# Patient Record
Sex: FEMALE | Race: WHITE | HISPANIC OR LATINO
[De-identification: names, ages, dates, MRNs, and addresses within clinical notes are randomized per-mention and may not be internally consistent; named-entity substitution may affect disease eponyms.]

---

## 2017-04-05 ENCOUNTER — APPOINTMENT (OUTPATIENT)
Dept: FAMILY MEDICINE | Facility: CLINIC | Age: 57
End: 2017-04-05

## 2017-04-05 VITALS
DIASTOLIC BLOOD PRESSURE: 78 MMHG | HEART RATE: 70 BPM | RESPIRATION RATE: 15 BRPM | HEIGHT: 61.5 IN | SYSTOLIC BLOOD PRESSURE: 120 MMHG | OXYGEN SATURATION: 98 % | BODY MASS INDEX: 25.72 KG/M2 | WEIGHT: 138 LBS | TEMPERATURE: 98.4 F

## 2017-04-05 DIAGNOSIS — R59.0 LOCALIZED ENLARGED LYMPH NODES: ICD-10-CM

## 2017-05-15 ENCOUNTER — EMERGENCY (EMERGENCY)
Facility: HOSPITAL | Age: 57
LOS: 1 days | Discharge: HOME | End: 2017-05-15
Admitting: EMERGENCY MEDICINE
Payer: COMMERCIAL

## 2017-05-15 PROCEDURE — 73630 X-RAY EXAM OF FOOT: CPT | Mod: 26,RT

## 2017-05-15 PROCEDURE — 99284 EMERGENCY DEPT VISIT MOD MDM: CPT | Mod: 25

## 2017-05-15 PROCEDURE — 73630 X-RAY EXAM OF FOOT: CPT

## 2017-05-15 PROCEDURE — 29515 APPLICATION SHORT LEG SPLINT: CPT

## 2017-05-15 PROCEDURE — 73610 X-RAY EXAM OF ANKLE: CPT | Mod: 26,RT

## 2017-05-15 PROCEDURE — 73610 X-RAY EXAM OF ANKLE: CPT

## 2017-05-15 PROCEDURE — 99283 EMERGENCY DEPT VISIT LOW MDM: CPT | Mod: 25

## 2017-05-15 PROCEDURE — 29515 APPLICATION SHORT LEG SPLINT: CPT | Mod: RT

## 2017-05-22 ENCOUNTER — APPOINTMENT (OUTPATIENT)
Dept: FAMILY MEDICINE | Facility: CLINIC | Age: 57
End: 2017-05-22

## 2017-06-22 ENCOUNTER — APPOINTMENT (OUTPATIENT)
Dept: RADIOLOGY | Facility: HOSPITAL | Age: 57
End: 2017-06-22

## 2017-06-22 ENCOUNTER — OUTPATIENT (OUTPATIENT)
Dept: OUTPATIENT SERVICES | Facility: HOSPITAL | Age: 57
LOS: 1 days | End: 2017-06-22
Payer: COMMERCIAL

## 2017-06-22 PROCEDURE — 73630 X-RAY EXAM OF FOOT: CPT

## 2019-08-19 ENCOUNTER — APPOINTMENT (OUTPATIENT)
Dept: FAMILY MEDICINE | Facility: CLINIC | Age: 59
End: 2019-08-19
Payer: COMMERCIAL

## 2019-08-19 VITALS
BODY MASS INDEX: 25.21 KG/M2 | TEMPERATURE: 98 F | OXYGEN SATURATION: 99 % | WEIGHT: 137 LBS | DIASTOLIC BLOOD PRESSURE: 78 MMHG | HEIGHT: 62 IN | SYSTOLIC BLOOD PRESSURE: 120 MMHG | HEART RATE: 61 BPM

## 2019-08-19 PROCEDURE — 99214 OFFICE O/P EST MOD 30 MIN: CPT

## 2019-08-19 RX ORDER — AMOXICILLIN AND CLAVULANATE POTASSIUM 875; 125 MG/1; MG/1
875-125 TABLET, COATED ORAL
Qty: 20 | Refills: 0 | Status: COMPLETED | COMMUNITY
Start: 2017-04-05 | End: 2019-08-19

## 2019-08-19 NOTE — PHYSICAL EXAM
[No Acute Distress] : no acute distress [Well Nourished] : well nourished [Well-Appearing] : well-appearing [Well Developed] : well developed [Normal Sclera/Conjunctiva] : normal sclera/conjunctiva [PERRL] : pupils equal round and reactive to light [Normal Outer Ear/Nose] : the outer ears and nose were normal in appearance [EOMI] : extraocular movements intact [No JVD] : no jugular venous distention [Normal Oropharynx] : the oropharynx was normal [Supple] : supple [No Lymphadenopathy] : no lymphadenopathy [Thyroid Normal, No Nodules] : the thyroid was normal and there were no nodules present [No Respiratory Distress] : no respiratory distress  [No Accessory Muscle Use] : no accessory muscle use [Clear to Auscultation] : lungs were clear to auscultation bilaterally [Normal Rate] : normal rate  [Regular Rhythm] : with a regular rhythm [No Murmur] : no murmur heard [Normal S1, S2] : normal S1 and S2 [No Carotid Bruits] : no carotid bruits [No Abdominal Bruit] : a ~M bruit was not heard ~T in the abdomen [Pedal Pulses Present] : the pedal pulses are present [No Varicosities] : no varicosities [No Edema] : there was no peripheral edema [No Palpable Aorta] : no palpable aorta [No Extremity Clubbing/Cyanosis] : no extremity clubbing/cyanosis [Soft] : abdomen soft [Non Tender] : non-tender [Non-distended] : non-distended [No Masses] : no abdominal mass palpated [Normal Bowel Sounds] : normal bowel sounds [No HSM] : no HSM [Normal Anterior Cervical Nodes] : no anterior cervical lymphadenopathy [Normal Posterior Cervical Nodes] : no posterior cervical lymphadenopathy [No Spinal Tenderness] : no spinal tenderness [No CVA Tenderness] : no CVA  tenderness [Coordination Grossly Intact] : coordination grossly intact [No Rash] : no rash [No Focal Deficits] : no focal deficits [Normal Gait] : normal gait [Normal Affect] : the affect was normal [Deep Tendon Reflexes (DTR)] : deep tendon reflexes were 2+ and symmetric [Normal Insight/Judgement] : insight and judgment were intact

## 2019-08-19 NOTE — HISTORY OF PRESENT ILLNESS
[FreeTextEntry8] : Patient notes intermittent sore right upper arm pain that is worse when she moves her arm that has persisted for one month. Pain worse when she has to lift arm straight up. 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 she is right hand dominant. She is unable to lift right arm straight up. She has not taken anything for the pain.\par \par She has not been seen in our office for several years. Patient [place] in [place]. She notes that she is good health and denies pertinent past medical history. She reports needing colonoscopy, mammogram and pap. She also in need of comprehensive labs. HX of pre diabetes. She reports adhering to a healthy diet.

## 2019-08-19 NOTE — HEALTH RISK ASSESSMENT
[Yes] : Yes [1 or 2 (0 pts)] : 1 or 2 (0 points) [Monthly or less (1 pt)] : Monthly or less (1 point) [Never (0 pts)] : Never (0 points) [0] : 2) Feeling down, depressed, or hopeless: Not at all (0) [] : No [de-identified] : NOne  [de-identified] : NO

## 2019-09-03 ENCOUNTER — NON-APPOINTMENT (OUTPATIENT)
Age: 59
End: 2019-09-03

## 2019-09-03 ENCOUNTER — APPOINTMENT (OUTPATIENT)
Dept: FAMILY MEDICINE | Facility: CLINIC | Age: 59
End: 2019-09-03
Payer: COMMERCIAL

## 2019-09-03 VITALS
OXYGEN SATURATION: 98 % | DIASTOLIC BLOOD PRESSURE: 70 MMHG | HEIGHT: 62 IN | BODY MASS INDEX: 25.58 KG/M2 | TEMPERATURE: 98.8 F | SYSTOLIC BLOOD PRESSURE: 120 MMHG | WEIGHT: 139 LBS | HEART RATE: 71 BPM

## 2019-09-03 DIAGNOSIS — M25.511 PAIN IN RIGHT SHOULDER: ICD-10-CM

## 2019-09-03 PROCEDURE — 36415 COLL VENOUS BLD VENIPUNCTURE: CPT

## 2019-09-03 PROCEDURE — 99213 OFFICE O/P EST LOW 20 MIN: CPT | Mod: 25

## 2019-09-05 PROBLEM — M25.511 RIGHT SHOULDER PAIN: Status: ACTIVE | Noted: 2019-08-19

## 2019-09-05 NOTE — HISTORY OF PRESENT ILLNESS
[de-identified] : Patients shoulder pain has improved. At rest she has no pain. She has mild pain when she lifts her arm straight up or laterally to the right. Meloxicam did help with the pain. \par \par Overall patient reports she is in her usual state of health. She did not have xray or labs taken at [address] [place] [address].

## 2019-09-05 NOTE — PHYSICAL EXAM
[No Acute Distress] : no acute distress [Well Nourished] : well nourished [Well Developed] : well developed [Well-Appearing] : well-appearing [Normal Sclera/Conjunctiva] : normal sclera/conjunctiva [PERRL] : pupils equal round and reactive to light [EOMI] : extraocular movements intact [Normal Oropharynx] : the oropharynx was normal [Normal Outer Ear/Nose] : the outer ears and nose were normal in appearance [No JVD] : no jugular venous distention [No Lymphadenopathy] : no lymphadenopathy [Supple] : supple [Thyroid Normal, No Nodules] : the thyroid was normal and there were no nodules present [No Respiratory Distress] : no respiratory distress  [No Accessory Muscle Use] : no accessory muscle use [Clear to Auscultation] : lungs were clear to auscultation bilaterally [Normal Rate] : normal rate  [Regular Rhythm] : with a regular rhythm [Normal S1, S2] : normal S1 and S2 [No Murmur] : no murmur heard [No Carotid Bruits] : no carotid bruits [No Abdominal Bruit] : a ~M bruit was not heard ~T in the abdomen [No Varicosities] : no varicosities [Pedal Pulses Present] : the pedal pulses are present [No Edema] : there was no peripheral edema [No Palpable Aorta] : no palpable aorta [No Extremity Clubbing/Cyanosis] : no extremity clubbing/cyanosis [Soft] : abdomen soft [Non Tender] : non-tender [Non-distended] : non-distended [No Masses] : no abdominal mass palpated [No HSM] : no HSM [Normal Bowel Sounds] : normal bowel sounds [Normal Posterior Cervical Nodes] : no posterior cervical lymphadenopathy [Normal Anterior Cervical Nodes] : no anterior cervical lymphadenopathy [No CVA Tenderness] : no CVA  tenderness [No Spinal Tenderness] : no spinal tenderness [No Rash] : no rash [Coordination Grossly Intact] : coordination grossly intact [No Focal Deficits] : no focal deficits [Normal Gait] : normal gait [Deep Tendon Reflexes (DTR)] : deep tendon reflexes were 2+ and symmetric [Normal Affect] : the affect was normal [Normal Insight/Judgement] : insight and judgment were intact

## 2019-09-13 LAB
ALBUMIN SERPL ELPH-MCNC: 4.9 G/DL
ALP BLD-CCNC: 71 U/L
ALT SERPL-CCNC: 58 U/L
ANION GAP SERPL CALC-SCNC: 12 MMOL/L
APPEARANCE: CLEAR
AST SERPL-CCNC: 32 U/L
BACTERIA: NEGATIVE
BASOPHILS # BLD AUTO: 0.02 K/UL
BASOPHILS NFR BLD AUTO: 0.3 %
BILIRUB SERPL-MCNC: 0.3 MG/DL
BILIRUBIN URINE: NEGATIVE
BLOOD URINE: NORMAL
BUN SERPL-MCNC: 26 MG/DL
CALCIUM SERPL-MCNC: 9.7 MG/DL
CHLORIDE SERPL-SCNC: 107 MMOL/L
CHOLEST SERPL-MCNC: 169 MG/DL
CHOLEST/HDLC SERPL: 3.9 RATIO
CO2 SERPL-SCNC: 23 MMOL/L
COLOR: NORMAL
CREAT SERPL-MCNC: 0.56 MG/DL
EOSINOPHIL # BLD AUTO: 0.11 K/UL
EOSINOPHIL NFR BLD AUTO: 1.8 %
ESTIMATED AVERAGE GLUCOSE: 108 MG/DL
FERRITIN SERPL-MCNC: 143 NG/ML
FOLATE SERPL-MCNC: 16.1 NG/ML
GLUCOSE QUALITATIVE U: NEGATIVE
GLUCOSE SERPL-MCNC: 106 MG/DL
HBA1C MFR BLD HPLC: 5.4 %
HCT VFR BLD CALC: 36.3 %
HDLC SERPL-MCNC: 43 MG/DL
HGB A MFR BLD: 97.5 %
HGB A2 MFR BLD: 2.5 %
HGB BLD-MCNC: 11.5 G/DL
HGB FRACT BLD-IMP: NORMAL
HYALINE CASTS: 0 /LPF
IMM GRANULOCYTES NFR BLD AUTO: 0.3 %
IRON SATN MFR SERPL: 24 %
IRON SERPL-MCNC: 108 UG/DL
KETONES URINE: NEGATIVE
LDLC SERPL CALC-MCNC: 103 MG/DL
LEUKOCYTE ESTERASE URINE: NEGATIVE
LYMPHOCYTES # BLD AUTO: 1.67 K/UL
LYMPHOCYTES NFR BLD AUTO: 27.5 %
MAN DIFF?: NORMAL
MCHC RBC-ENTMCNC: 31.7 GM/DL
MCHC RBC-ENTMCNC: 32.5 PG
MCV RBC AUTO: 102.5 FL
MICROSCOPIC-UA: NORMAL
MONOCYTES # BLD AUTO: 0.33 K/UL
MONOCYTES NFR BLD AUTO: 5.4 %
NEUTROPHILS # BLD AUTO: 3.92 K/UL
NEUTROPHILS NFR BLD AUTO: 64.7 %
NITRITE URINE: NEGATIVE
PH URINE: 6
PLATELET # BLD AUTO: 269 K/UL
POTASSIUM SERPL-SCNC: 3.8 MMOL/L
PROT SERPL-MCNC: 7.3 G/DL
PROTEIN URINE: NEGATIVE
RBC # BLD: 3.54 M/UL
RBC # FLD: 12.8 %
RED BLOOD CELLS URINE: 4 /HPF
SODIUM SERPL-SCNC: 142 MMOL/L
SPECIFIC GRAVITY URINE: 1.03
SQUAMOUS EPITHELIAL CELLS: 1 /HPF
TIBC SERPL-MCNC: 446 UG/DL
TRIGL SERPL-MCNC: 113 MG/DL
UIBC SERPL-MCNC: 338 UG/DL
UROBILINOGEN URINE: NORMAL
VIT B12 SERPL-MCNC: 395 PG/ML
WBC # FLD AUTO: 6.07 K/UL
WHITE BLOOD CELLS URINE: 3 /HPF

## 2019-09-16 ENCOUNTER — APPOINTMENT (OUTPATIENT)
Dept: FAMILY MEDICINE | Facility: CLINIC | Age: 59
End: 2019-09-16

## 2019-09-17 ENCOUNTER — APPOINTMENT (OUTPATIENT)
Dept: FAMILY MEDICINE | Facility: CLINIC | Age: 59
End: 2019-09-17

## 2019-10-21 ENCOUNTER — APPOINTMENT (OUTPATIENT)
Dept: SURGERY | Facility: CLINIC | Age: 59
End: 2019-10-21
Payer: COMMERCIAL

## 2019-10-21 VITALS — BODY MASS INDEX: 23.05 KG/M2 | HEIGHT: 64 IN | WEIGHT: 135 LBS

## 2019-10-21 DIAGNOSIS — R10.31 RIGHT LOWER QUADRANT PAIN: ICD-10-CM

## 2019-10-21 DIAGNOSIS — Z83.3 FAMILY HISTORY OF DIABETES MELLITUS: ICD-10-CM

## 2019-10-21 DIAGNOSIS — Z83.79 FAMILY HISTORY OF OTHER DISEASES OF THE DIGESTIVE SYSTEM: ICD-10-CM

## 2019-10-21 DIAGNOSIS — Z82.49 FAMILY HISTORY OF ISCHEMIC HEART DISEASE AND OTHER DISEASES OF THE CIRCULATORY SYSTEM: ICD-10-CM

## 2019-10-21 DIAGNOSIS — R10.32 LEFT LOWER QUADRANT PAIN: ICD-10-CM

## 2019-10-21 PROCEDURE — 99203 OFFICE O/P NEW LOW 30 MIN: CPT

## 2019-10-22 PROBLEM — R10.31 ABDOMINAL PAIN, RLQ (RIGHT LOWER QUADRANT): Status: ACTIVE | Noted: 2019-10-22

## 2019-10-22 PROBLEM — R10.32 ABDOMINAL PAIN, ACUTE, LEFT LOWER QUADRANT: Status: ACTIVE | Noted: 2019-10-22

## 2019-10-22 NOTE — REVIEW OF SYSTEMS
[Shortness Of Breath] : no shortness of breath [Cough] : no cough [Abdominal Pain] : abdominal pain [Constipation] : no constipation [Negative] : Genitourinary

## 2019-10-22 NOTE — PHYSICAL EXAM
[Normal Breath Sounds] : Normal breath sounds [Normal Heart Sounds] : normal heart sounds [Normal Rate and Rhythm] : normal rate and rhythm [Abdominal Masses] : Abdominal mass present [Abdomen Tenderness] : ~T ~M No abdominal tenderness [No Rash or Lesion] : No rash or lesion [de-identified] : nl [de-identified] : nl [de-identified] : multiloculated mass in suprapubic area [de-identified] : nl

## 2019-10-22 NOTE — HISTORY OF PRESENT ILLNESS
[de-identified] : This 59 year old woman has been experiencing lower abdominal pain for the past two months. The patient has never undergone a colonoscopy. The patient denied any change in bowel habits nor blood in the stool.

## 2019-10-22 NOTE — ASSESSMENT
[FreeTextEntry1] : Long discussion regarding all options and risks\par To undergo a CT scan of abdomen and pelvis\par Colonoscopy in the near future

## 2019-10-28 ENCOUNTER — FORM ENCOUNTER (OUTPATIENT)
Age: 59
End: 2019-10-28

## 2019-10-29 ENCOUNTER — OUTPATIENT (OUTPATIENT)
Dept: OUTPATIENT SERVICES | Facility: HOSPITAL | Age: 59
LOS: 1 days | End: 2019-10-29
Payer: COMMERCIAL

## 2019-10-29 ENCOUNTER — APPOINTMENT (OUTPATIENT)
Dept: CT IMAGING | Facility: HOSPITAL | Age: 59
End: 2019-10-29
Payer: COMMERCIAL

## 2019-10-29 DIAGNOSIS — Z00.8 ENCOUNTER FOR OTHER GENERAL EXAMINATION: ICD-10-CM

## 2019-10-29 PROCEDURE — 74177 CT ABD & PELVIS W/CONTRAST: CPT

## 2019-10-29 PROCEDURE — 74177 CT ABD & PELVIS W/CONTRAST: CPT | Mod: 26

## 2022-11-01 ENCOUNTER — EMERGENCY (EMERGENCY)
Facility: HOSPITAL | Age: 62
LOS: 1 days | Discharge: HOME | End: 2022-11-01
Attending: EMERGENCY MEDICINE | Admitting: EMERGENCY MEDICINE
Payer: COMMERCIAL

## 2022-11-01 VITALS
WEIGHT: 130.07 LBS | SYSTOLIC BLOOD PRESSURE: 157 MMHG | DIASTOLIC BLOOD PRESSURE: 94 MMHG | RESPIRATION RATE: 19 BRPM | HEIGHT: 64 IN | HEART RATE: 76 BPM | OXYGEN SATURATION: 99 % | TEMPERATURE: 98 F

## 2022-11-01 PROCEDURE — 73060 X-RAY EXAM OF HUMERUS: CPT | Mod: 26,LT

## 2022-11-01 PROCEDURE — G1004: CPT

## 2022-11-01 PROCEDURE — 73200 CT UPPER EXTREMITY W/O DYE: CPT | Mod: 26,59,LT,MG

## 2022-11-01 PROCEDURE — 73030 X-RAY EXAM OF SHOULDER: CPT | Mod: 26,LT,77

## 2022-11-01 PROCEDURE — 73200 CT UPPER EXTREMITY W/O DYE: CPT | Mod: 26,77,LT,MA

## 2022-11-01 PROCEDURE — 99285 EMERGENCY DEPT VISIT HI MDM: CPT

## 2022-11-01 PROCEDURE — 73030 X-RAY EXAM OF SHOULDER: CPT | Mod: 26,LT

## 2022-11-01 PROCEDURE — 76376 3D RENDER W/INTRP POSTPROCES: CPT | Mod: 26,77

## 2022-11-01 PROCEDURE — 76376 3D RENDER W/INTRP POSTPROCES: CPT | Mod: 26

## 2022-11-01 RX ORDER — MORPHINE SULFATE 50 MG/1
4 CAPSULE, EXTENDED RELEASE ORAL ONCE
Refills: 0 | Status: DISCONTINUED | OUTPATIENT
Start: 2022-11-01 | End: 2022-11-01

## 2022-11-01 RX ORDER — PROPOFOL 10 MG/ML
50 INJECTION, EMULSION INTRAVENOUS ONCE
Refills: 0 | Status: COMPLETED | OUTPATIENT
Start: 2022-11-01 | End: 2022-11-01

## 2022-11-01 RX ORDER — MORPHINE SULFATE 50 MG/1
6 CAPSULE, EXTENDED RELEASE ORAL ONCE
Refills: 0 | Status: DISCONTINUED | OUTPATIENT
Start: 2022-11-01 | End: 2022-11-01

## 2022-11-01 RX ORDER — PROPOFOL 10 MG/ML
25 INJECTION, EMULSION INTRAVENOUS ONCE
Refills: 0 | Status: COMPLETED | OUTPATIENT
Start: 2022-11-01 | End: 2022-11-01

## 2022-11-01 RX ORDER — KETOROLAC TROMETHAMINE 30 MG/ML
30 SYRINGE (ML) INJECTION ONCE
Refills: 0 | Status: DISCONTINUED | OUTPATIENT
Start: 2022-11-01 | End: 2022-11-01

## 2022-11-01 RX ORDER — ONDANSETRON 8 MG/1
4 TABLET, FILM COATED ORAL ONCE
Refills: 0 | Status: COMPLETED | OUTPATIENT
Start: 2022-11-01 | End: 2022-11-01

## 2022-11-01 RX ADMIN — Medication 30 MILLIGRAM(S): at 22:28

## 2022-11-01 RX ADMIN — PROPOFOL 25 MILLIGRAM(S): 10 INJECTION, EMULSION INTRAVENOUS at 22:44

## 2022-11-01 RX ADMIN — MORPHINE SULFATE 4 MILLIGRAM(S): 50 CAPSULE, EXTENDED RELEASE ORAL at 17:32

## 2022-11-01 RX ADMIN — MORPHINE SULFATE 6 MILLIGRAM(S): 50 CAPSULE, EXTENDED RELEASE ORAL at 11:51

## 2022-11-01 RX ADMIN — MORPHINE SULFATE 4 MILLIGRAM(S): 50 CAPSULE, EXTENDED RELEASE ORAL at 13:53

## 2022-11-01 RX ADMIN — MORPHINE SULFATE 4 MILLIGRAM(S): 50 CAPSULE, EXTENDED RELEASE ORAL at 22:28

## 2022-11-01 RX ADMIN — ONDANSETRON 4 MILLIGRAM(S): 8 TABLET, FILM COATED ORAL at 13:53

## 2022-11-01 RX ADMIN — Medication 30 MILLIGRAM(S): at 21:57

## 2022-11-01 RX ADMIN — MORPHINE SULFATE 4 MILLIGRAM(S): 50 CAPSULE, EXTENDED RELEASE ORAL at 19:54

## 2022-11-01 RX ADMIN — PROPOFOL 25 MILLIGRAM(S): 10 INJECTION, EMULSION INTRAVENOUS at 22:43

## 2022-11-01 RX ADMIN — MORPHINE SULFATE 4 MILLIGRAM(S): 50 CAPSULE, EXTENDED RELEASE ORAL at 21:57

## 2022-11-01 RX ADMIN — MORPHINE SULFATE 4 MILLIGRAM(S): 50 CAPSULE, EXTENDED RELEASE ORAL at 20:05

## 2022-11-01 RX ADMIN — PROPOFOL 50 MILLIGRAM(S): 10 INJECTION, EMULSION INTRAVENOUS at 22:42

## 2022-11-01 RX ADMIN — MORPHINE SULFATE 6 MILLIGRAM(S): 50 CAPSULE, EXTENDED RELEASE ORAL at 13:36

## 2022-11-01 NOTE — ED PROVIDER NOTE - NSFOLLOWUPINSTRUCTIONS_ED_ALL_ED_FT
- keep left arm in sling/immobilizer. no weight bearing on Left arm  - take ibuprofen for pain, percocet in addition for stronger pain  - see orthopedic Dr Juarez this week.  - return for worse pain, arm numbness/weakness, cold, pale or other concerns          Moderate Sedation    WHAT YOU NEED TO KNOW:    Moderate sedation, or conscious sedation, is medicine used during procedures to help you feel relaxed and calm. You will be awake and able to follow directions without anxiety or pain. You will remember little to none of the procedure. You may feel tired, weak, or unsteady on your feet after you get sedation. You may also have trouble concentrating or short-term memory loss. These symptoms should go away in 24 hours or less.    DISCHARGE INSTRUCTIONS:    Call 911 or have someone else call for any of the following:   •You have sudden trouble breathing.      •You cannot be woken.      Return to the emergency department if:   •You have a severe headache or dizziness.      •Your heart is beating faster than usual.      Contact your healthcare provider if:   •You have a fever.      •You have nausea or are vomiting for more than 8 hours after the procedure.      •Your skin is itchy, swollen, or you have a rash.      •You have questions or concerns about your condition or care.      Self-care:   •Have someone stay with you for 24 hours. This person can drive you to errands and help you do things around the house. This person can also watch for problems.      •Rest and do quiet activities for 24 hours. Do not exercise, ride a bike, or play sports. Stand up slowly to prevent dizziness and falls. Take short walks around the house with another person. Slowly return to your usual activities the next day.      •Do not drive or use dangerous machines or tools for 24 hours. You may injure yourself or others. Examples include a lawnmower, saw, or drill. Do not return to work for 24 hours if you use dangerous machines or tools for work.      •Do not make important decisions for 24 hours. For example, do not sign important papers or invest money.      •Drink liquids as directed. Liquids help flush the sedation medicine out of your body. Ask how much liquid to drink each day and which liquids are best for you.      •Eat small, frequent meals to prevent nausea and vomiting. Start with clear liquids such as juice or broth. If you do not vomit after clear liquids, you can eat your usual foods.      •Do not drink alcohol or take medicines that make you drowsy. This includes medicines that help you sleep and anxiety medicines. Ask your healthcare provider if it is safe for you to take pain medicine.      Follow up with your healthcare provider as directed: Write down your questions so you remember to ask them during your visits.       © Copyright [employer] 2022           Shoulder Dislocation    WHAT YOU NEED TO KNOW:    A shoulder dislocation happens when the top of your arm bone (humerus) moves out of the socket in your shoulder blade.  Dislocated Shoulder         DISCHARGE INSTRUCTIONS:    Return to the emergency department if:   •Your shoulder and arm become pale or cold.      •You cannot move your shoulder and arm.      •You have more redness or swelling in your shoulder.      •Your shoulder becomes dislocated again.      Call your doctor if:   •You have a fever.      •You have more pain in your shoulder and arm even after you rest and take your medicine.      •You have new weakness or numbness in your shoulder and arm.      •You have questions or concerns about your condition or care.      Medicines:You may need any of the following:   •Prescription pain medicine may be given. Ask your healthcare provider how to take this medicine safely. Some prescription pain medicines contain acetaminophen. Do not take other medicines that contain acetaminophen without talking to your healthcare provider. Too much acetaminophen may cause liver damage. Prescription pain medicine may cause constipation. Ask your healthcare provider how to prevent or treat constipation.       •A muscle relaxer may help the tight muscles in your shoulder relax.      •Take your medicine as directed. Contact your healthcare provider if you think your medicine is not helping or if you have side effects. Tell your provider if you are allergic to any medicine. Keep a list of the medicines, vitamins, and herbs you take. Include the amounts, and when and why you take them. Bring the list or the pill bottles to follow-up visits. Carry your medicine list with you in case of an emergency.      Rest your shoulder and arm: Rest helps your muscles and tissues heal. Avoid activities that cause pain or use an overhead arm motion. Your healthcare provider will tell you when it is safe to return to sports or other daily activities.    How to wear a brace, sling, or splint: A brace, sling, or splint may be needed to limit your movement and protect your shoulder.  Shoulder Sling       •Wear your brace, sling, or splint all the time. Take it off only to bathe or do exercises as directed. Ask your healthcare provider how many weeks you should wear it.      •Keep your skin clean and dry. Place padding under your armpit to help absorb sweat and prevent sores on your skin.      •Do not hunch your shoulders. This may cause pain. Keep your shoulders relaxed.      •Support your wrist and hand with the sling. Cover the knuckles on your hand with your sling. Your wrist should be positioned higher than your elbow. Your wrist may start to hurt or go numb if your sling is too short.      Apply ice: Ice helps decrease swelling and pain. Ice may also help prevent tissue damage. Use an ice pack, or put crushed ice in a plastic bag. Cover it with a towel before you place it on your shoulder. Apply ice for 15 to 20 minutes every hour or as directed.    Exercises: Begin gentle exercises as directed. After healing begins, you may start light exercises so your shoulder does not get stiff. Go to physical therapy if directed. A physical therapist teaches you exercises to help improve movement and strength, and to decrease pain. Do not lift heavy objects or do any exercise that causes severe pain.    Follow up with your doctor in 5 to 10 days: Write down your questions so you remember to ask them during your visits.           Proximal Humerus Fracture    WHAT YOU NEED TO KNOW:    A proximal humerus fracture is a crack or break in the top of your upper arm bone. The proximal humerus is one of the bones in your shoulder joint.  Shoulder Anatomy         DISCHARGE INSTRUCTIONS:    Return to the emergency department if:   •Your pain does not get better or gets worse, even after you rest and take medicine.      •Your arm, hand, or fingers feel numb.      •The skin over your fracture is swollen, cold, or pale.      •You cannot move your arm, hand, or fingers.       Call your doctor or orthopedist if:   •You have a fever.      •Your sling gets wet, damaged, or falls off.      •You have questions or concerns about your condition or care.      Medicines: You may need any of the following:   •Prescription pain medicine may be given. Ask your healthcare provider how to take this medicine safely. Some prescription pain medicines contain acetaminophen. Do not take other medicines that contain acetaminophen without talking to your healthcare provider. Too much acetaminophen may cause liver damage. Prescription pain medicine may cause constipation. Ask your healthcare provider how to prevent or treat constipation.       •NSAIDs, such as ibuprofen, help decrease swelling, pain, and fever. This medicine is available with or without a doctor's order. NSAIDs can cause stomach bleeding or kidney problems in certain people. If you take blood thinner medicine, always ask your healthcare provider if NSAIDs are safe for you. Always read the medicine label and follow directions.      •Acetaminophen decreases pain and fever. It is available without a doctor's order. Ask how much to take and how often to take it. Follow directions. Read the labels of all other medicines you are using to see if they also contain acetaminophen, or ask your doctor or pharmacist. Acetaminophen can cause liver damage if not taken correctly.      •Take your medicine as directed. Contact your healthcare provider if you think your medicine is not helping or if you have side effects. Tell your provider if you are allergic to any medicine. Keep a list of the medicines, vitamins, and herbs you take. Include the amounts, and when and why you take them. Bring the list or the pill bottles to follow-up visits. Carry your medicine list with you in case of an emergency.      A sling may be needed to hold your broken bones in place. It will decrease your arm movement and allow the bones to heal.  Shoulder Sling         Manage your symptoms:   •Rest your arm as much as possible. Ask your healthcare provider when you can move your arm. Also ask when you can return to sports or vigorous exercises.      •Apply ice on your arm for 15 to 20 minutes every hour or as directed. Use an ice pack, or put crushed ice in a plastic bag. Cover it with a towel before you put it on your arm. Ice helps prevent tissue damage and decreases swelling and pain.      •Go to physical therapy as directed. A physical therapist teaches you exercises to help improve movement and strength, and to decrease pain.      Follow up with your doctor or orthopedist as directed: Write down your questions so you remember to ask them during your visits.

## 2022-11-01 NOTE — ED ADULT NURSE NOTE - NS PRO PASSIVE SMOKE EXP
Requested updates within Care Everywhere.  Patient's chart was reviewed for overdue KEVIN topics.  Immunizations reconciled.     
Unknown

## 2022-11-01 NOTE — ED PROVIDER NOTE - CLINICAL SUMMARY MEDICAL DECISION MAKING FREE TEXT BOX
61yo female with left shoulder pain, xr r//o fx vs dislocation, pain meds, ortho follow up 63yo female with left shoulder pain, xr r//o fx vs dislocation, pain meds, ortho follow up    0023 dr gutierrez: pt s/p L shoulder reduction by ortho Dr Juarez under modreate sedation. tolerated procedure well. no complications. xray shows reduction. ct requested by ortho.  f/u ortho thursday. pain improved. pt fully awake, alert. BREA GOTTI. placed in sling

## 2022-11-01 NOTE — ED ADULT NURSE NOTE - OBJECTIVE STATEMENT
Assumed pt care for a 62 yr old female complaining of a fall. Pt reports she was at work walking the dog and the dog pulled her and she fell landing on her right shoulder. Pt reports pain to right shoulder. Meds given as per orders. Awaiting further disposition.

## 2022-11-01 NOTE — ED PROVIDER NOTE - OBJECTIVE STATEMENT
63yo female with left shoulder pain s/p fall. pt was chasing the owners dog down the street and she tripped and fell, no head trauma no LOC, pt c/o left shoulder pain, no tingling or weakness pt is right hand dominant

## 2022-11-01 NOTE — ED PROVIDER NOTE - PATIENT PORTAL LINK FT
You can access the FollowMyHealth Patient Portal offered by [facility] by registering at the following website: [email or web address]. By joining [employer]’s FollowMyHealth portal, you will also be able to view your health information using other applications (apps) compatible with our system.

## 2022-11-01 NOTE — CONSULT NOTE ADULT - SUBJECTIVE AND OBJECTIVE BOX
63 y/o female s/p fall while chasing her bosses dog and fell this AM  she is RHD  denies any other injuries  is currently in a sling and comfortable    PE:  - swelling left shoulder  - AIN/PIN/UN/MN/RN intact  - compartments soft  - strength and ROM of the left UE deferred due to acute trauma and pain and patient apprehension   - RA pulse 2+    images:  - left proximal humerus/greater tuberosity fracture with ant dislocation     A/P:  left shoulder dislocation  left proximal humerus fracture  left greater tuberosity fracture  - have discussed with patient the risks and the benefits for closed reduction treatment of the fracture and dislocation   - conscious sedation obtained and closed reduction with manipulation of the fracture completed   - normal motor function post reduction   - NWB  - sling immobilization  - pain control  - vitamin D  - will repeat the CT scan post reduction and plan to follow in office to schedule for surgical stabalization   - patient and family agree    Procedure:  conscious sedation by ER team--> gentle traction and closed reduction with manipulation of the left shoulder performed-->  reduction of the fractures of the greater tuberosity/proximal humerus along with shoulder dislocation completed-->  post reduction xrays reviewed and noted to be stable

## 2022-11-01 NOTE — ED PROVIDER NOTE - MUSCULOSKELETAL [+], MLM
Discharged reviewed with pt  Pt verbalized understanding and has no further questions at this time  Pt ambulatory off unit with steady gait       Danita Mata RN  07/15/19 4336 JOINT PAIN/LIMITED RANGE OF MOTION

## 2022-11-01 NOTE — ED PROVIDER NOTE - CARE PROVIDER_API CALL
Refugio Juarez)  Orthopaedic Surgery  [address] [place] [address]  Phone: [phone]  Fax: [phone]  Follow Up Time: 1-3 Days

## 2022-11-01 NOTE — ED ADULT NURSE NOTE - NSSUHOSCREENINGYN_ED_ALL_ED
Orthostatic hypotension Orthostatic hypotension Orthostatic hypotension Orthostatic hypotension Yes - the patient is able to be screened

## 2022-11-01 NOTE — ED PROVIDER NOTE - CCCP TRG CHIEF CMPLNT
Incoming call from pt. He states [facility] has not received an updated prescription for his increased Flomax dose. He is now taking 0.8 mg, as advised by Dr. Quesada. Reviewed Dr. Quesada' note from 4/1, and sent script for increased dose of Flomax to pt's pharmacy.    He wanted to let Dr. Quesada know that the increased dose seems to be helping and his symptoms are much improved, so he is going to hold off on starting the finasteride for the time being.     Dr. Quesada, MADELYN.   arm pain/injury

## 2022-11-02 VITALS
SYSTOLIC BLOOD PRESSURE: 134 MMHG | OXYGEN SATURATION: 98 % | RESPIRATION RATE: 18 BRPM | HEART RATE: 82 BPM | TEMPERATURE: 98 F | DIASTOLIC BLOOD PRESSURE: 72 MMHG

## 2022-11-02 PROCEDURE — 96374 THER/PROPH/DIAG INJ IV PUSH: CPT

## 2022-11-02 PROCEDURE — 96375 TX/PRO/DX INJ NEW DRUG ADDON: CPT

## 2022-11-02 PROCEDURE — 73200 CT UPPER EXTREMITY W/O DYE: CPT | Mod: MG

## 2022-11-02 PROCEDURE — 73060 X-RAY EXAM OF HUMERUS: CPT

## 2022-11-02 PROCEDURE — G1004: CPT

## 2022-11-02 PROCEDURE — 96376 TX/PRO/DX INJ SAME DRUG ADON: CPT

## 2022-11-02 PROCEDURE — 73030 X-RAY EXAM OF SHOULDER: CPT

## 2022-11-02 PROCEDURE — 99284 EMERGENCY DEPT VISIT MOD MDM: CPT | Mod: 25

## 2022-11-02 PROCEDURE — 76376 3D RENDER W/INTRP POSTPROCES: CPT

## 2022-11-02 RX ORDER — IBUPROFEN 200 MG
1 TABLET ORAL
Qty: 30 | Refills: 0
Start: 2022-11-02

## 2022-11-02 NOTE — ED ADULT NURSE REASSESSMENT NOTE - NS ED NURSE REASSESS COMMENT FT1
I spoke to family member in regards to Ms. Couch. She is feeling better and has an appointment with ortho Dr. Juarez tomorrow, very pleased with care Dr. Juarez "really knows what he is doing"

## 2022-11-03 ENCOUNTER — NON-APPOINTMENT (OUTPATIENT)
Age: 62
End: 2022-11-03

## 2022-11-03 ENCOUNTER — APPOINTMENT (OUTPATIENT)
Dept: FAMILY MEDICINE | Facility: CLINIC | Age: 62
End: 2022-11-03
Payer: COMMERCIAL

## 2022-11-03 VITALS
WEIGHT: 135 LBS | HEART RATE: 83 BPM | RESPIRATION RATE: 17 BRPM | DIASTOLIC BLOOD PRESSURE: 76 MMHG | BODY MASS INDEX: 24.84 KG/M2 | TEMPERATURE: 97.7 F | SYSTOLIC BLOOD PRESSURE: 122 MMHG | OXYGEN SATURATION: 98 % | HEIGHT: 62 IN

## 2022-11-03 DIAGNOSIS — S42.252A DISPLACED FRACTURE OF GREATER TUBEROSITY OF LEFT HUMERUS, INITIAL ENCOUNTER FOR CLOSED FRACTURE: ICD-10-CM

## 2022-11-03 PROBLEM — Z78.9 OTHER SPECIFIED HEALTH STATUS: Chronic | Status: ACTIVE | Noted: 2022-11-01

## 2022-11-03 PROCEDURE — 99072 ADDL SUPL MATRL&STAF TM PHE: CPT

## 2022-11-03 PROCEDURE — 93000 ELECTROCARDIOGRAM COMPLETE: CPT

## 2022-11-03 PROCEDURE — 99214 OFFICE O/P EST MOD 30 MIN: CPT | Mod: 25

## 2022-11-03 RX ORDER — MELOXICAM 7.5 MG/1
7.5 TABLET ORAL TWICE DAILY
Qty: 30 | Refills: 0 | Status: DISCONTINUED | COMMUNITY
Start: 2019-08-19 | End: 2022-11-03

## 2022-11-08 ENCOUNTER — APPOINTMENT (OUTPATIENT)
Dept: CARDIOLOGY | Facility: CLINIC | Age: 62
End: 2022-11-08
Payer: COMMERCIAL

## 2022-11-08 ENCOUNTER — NON-APPOINTMENT (OUTPATIENT)
Age: 62
End: 2022-11-08

## 2022-11-08 VITALS — DIASTOLIC BLOOD PRESSURE: 80 MMHG | SYSTOLIC BLOOD PRESSURE: 110 MMHG | HEART RATE: 60 BPM

## 2022-11-08 VITALS
WEIGHT: 135 LBS | TEMPERATURE: 97.6 F | RESPIRATION RATE: 16 BRPM | OXYGEN SATURATION: 98 % | BODY MASS INDEX: 24.84 KG/M2 | HEART RATE: 65 BPM | HEIGHT: 62 IN

## 2022-11-08 PROCEDURE — 99072 ADDL SUPL MATRL&STAF TM PHE: CPT

## 2022-11-08 PROCEDURE — 99214 OFFICE O/P EST MOD 30 MIN: CPT

## 2022-11-08 PROCEDURE — 93000 ELECTROCARDIOGRAM COMPLETE: CPT | Mod: NC

## 2022-11-08 NOTE — RESULTS/DATA
[] : results reviewed [de-identified] : 11/04/2022: Results within acceptable limits [de-identified] : 11/04/2022: Results within acceptable limits [de-identified] : 11/04/2022: Results within acceptable limits [de-identified] : 11/03/2022: NSR 78 bpm, nonspecific ST-T wave changes with depression, compared with ECG 03/2016, noted ECG changes

## 2022-11-08 NOTE — REASON FOR VISIT
[Other: ____] : [unfilled] [FreeTextEntry1] : Patient presents for cardiac clearance for surgery on a broken arm.  She broke her arm in a fall.  She was here in 2016 for shortness of breath after shoveling snow and had an echocardiogram that was normal.  Since that time she has felt well and offers no complaints.  There have been no cardiac issues since then.  She has no history of hypertension diabetes or smoking.

## 2022-11-08 NOTE — HISTORY OF PRESENT ILLNESS
[No Pertinent Cardiac History] : no history of aortic stenosis, atrial fibrillation, coronary artery disease, recent myocardial infarction, or implantable device/pacemaker [No Pertinent Pulmonary History] : no history of asthma, COPD, sleep apnea, or smoking [(Patient denies any chest pain, claudication, dyspnea on exertion, orthopnea, palpitations or syncope)] : Patient denies any chest pain, claudication, dyspnea on exertion, orthopnea, palpitations or syncope [Good (7-10 METs)] : Good (7-10 METs) [Aortic Stenosis] : no aortic stenosis [Atrial Fibrillation] : no atrial fibrillation [Coronary Artery Disease] : no coronary artery disease [Recent Myocardial Infarction] : no recent myocardial infarction [Implantable Device/Pacemaker] : no implantable device/pacemaker [Asthma] : no asthma [COPD] : no COPD [Sleep Apnea] : no sleep apnea [Smoker] : not a smoker [Family Member] : no family member with adverse anesthesia reaction/sudden death [Self] : no previous adverse anesthesia reaction [Chronic Anticoagulation] : no chronic anticoagulation [Chronic Kidney Disease] : no chronic kidney disease [Diabetes] : no diabetes [FreeTextEntry1] : Open Reduction Internal fixation of Left shoulder [FreeTextEntry2] : 11/09/2022 [FreeTextEntry3] : Dr. Juarez [FreeTextEntry4] : Ms Sergio Couch is a 63 yo female presents today with  for preop evaluation for a Open Reduction Internal fixation of Left shoulder to be done by Dr. Juarez at [facility] on 11/09/2022. Pt had sustained mechanical fall and injury to left shoulder injury on Nov 1st, was taken to [facility] where she had her left shoulder dislocation reduced and recommended to follow up with orthopedist. Pt had imaging which showed, Humeral head anteriorly dislocated with comminuted displaced fracture of the greater tuberosity. Pt notes last cardiac screening in 2016, had ECHO completed and not returned for stress testing. Pt advised need for cardiology clearance prior to surgery as well.  [FreeTextEntry7] : 11/03/2022: NSR 78 bpm, nonspecific ST-T wave changes with depression, compared with ECG 03/2016, noted ecg changes\par pending cardiac eval

## 2022-11-08 NOTE — REVIEW OF SYSTEMS
[Joint Pain] : joint pain [Joint Stiffness] : joint stiffness [Muscle Pain] : muscle pain [Negative] : Heme/Lymph [FreeTextEntry9] : left shoulder

## 2022-11-08 NOTE — PHYSICAL EXAM
[No Acute Distress] : no acute distress [Well Nourished] : well nourished [EOMI] : extraocular movements intact [Supple] : supple [Clear to Auscultation] : lungs were clear to auscultation bilaterally [Normal S1, S2] : normal S1 and S2 [No Edema] : there was no peripheral edema [Normal Gait] : normal gait [Normal Affect] : the affect was normal [de-identified] : left shoulder in sling, rom limited with pain.

## 2022-11-08 NOTE — ASSESSMENT
[FreeTextEntry1] : In summary, the patient is a 62-year-old woman with no known history of coronary events or congestive heart failure.\par \par She has no cardiac symptomatology, a normal cardiac exam, and a normal EKG.\par \par There is no cardiac contraindication to the proposed procedure

## 2022-11-08 NOTE — ASSESSMENT
[High Risk Surgery - Intraperitoneal, Intrathoracic or Supringuinal Vascular Procedures] : High Risk Surgery - Intraperitoneal, Intrathoracic or Supringuinal Vascular Procedures - No (0) [Ischemic Heart Disease] : Ischemic Heart Disease - No (0) [Congestive Heart Failure] : Congestive Heart Failure - No (0) [Prior Cerebrovascular Accident or TIA] : Prior Cerebrovascular Accident or TIA - No (0) [Creatinine >= 2mg/dL (1 Point)] : Creatinine >= 2mg/dL - No (0) [Insulin-dependent Diabetic (1 Point)] : Insulin-dependent Diabetic - No (0) [0] : 0 , RCRI Class: I, Risk of Post-Op Cardiac Complications: 3.9%, 95% CI for Risk Estimate: 2.8% - 5.4% [Patient Optimized for Surgery] : Patient optimized for surgery [Cardiology consultation] : Cardiology consultation [As per surgery] : as per surgery [FreeTextEntry4] : Completed physical Exam, reviewed pre op labs, EKG reviewed\par Advised pt to avoid any NSAIDs, vitamins, aspirin for 7 days till post surgery.\par  Avoid any food or drinks past midnight, the day prior to surgery. \par Pt may take rest of the medication with small sips of water the day of the surgery.\par  RTO for a follow up appointment post surgery.\par \par RCRI Class I for surgery\par Recieved cardiac clearance. \par Pt at current time has been optimized for surgery\par

## 2022-11-17 NOTE — ED PROCEDURE NOTE - NS_POSTPROCCAREGUIDE_ED_ALL_ED
Patient is now fully awake, with vital signs and temperature stable, hydration is adequate, patients Rachel’s  score is at baseline (or greater than 8), patient and escort has received  discharge education.

## 2023-07-05 ENCOUNTER — APPOINTMENT (OUTPATIENT)
Dept: FAMILY MEDICINE | Facility: CLINIC | Age: 63
End: 2023-07-05
Payer: COMMERCIAL

## 2023-07-05 VITALS
WEIGHT: 142 LBS | SYSTOLIC BLOOD PRESSURE: 129 MMHG | HEART RATE: 74 BPM | HEIGHT: 62 IN | RESPIRATION RATE: 14 BRPM | OXYGEN SATURATION: 98 % | TEMPERATURE: 98.2 F | BODY MASS INDEX: 26.13 KG/M2 | DIASTOLIC BLOOD PRESSURE: 79 MMHG

## 2023-07-05 DIAGNOSIS — M25.612 STIFFNESS OF LEFT SHOULDER, NOT ELSEWHERE CLASSIFIED: ICD-10-CM

## 2023-07-05 DIAGNOSIS — Z01.818 ENCOUNTER FOR OTHER PREPROCEDURAL EXAMINATION: ICD-10-CM

## 2023-07-05 DIAGNOSIS — M25.512 PAIN IN LEFT SHOULDER: ICD-10-CM

## 2023-07-05 PROCEDURE — 99214 OFFICE O/P EST MOD 30 MIN: CPT

## 2023-07-05 RX ORDER — OXYCODONE AND ACETAMINOPHEN 5; 325 MG/1; MG/1
5-325 TABLET ORAL
Qty: 12 | Refills: 0 | Status: DISCONTINUED | COMMUNITY
Start: 2022-11-02 | End: 2023-07-05

## 2023-07-05 RX ORDER — POTASSIUM CHLORIDE 1500 MG/1
20 TABLET, EXTENDED RELEASE ORAL
Qty: 2 | Refills: 0 | Status: ACTIVE | COMMUNITY
Start: 2023-07-05 | End: 1900-01-01

## 2023-07-05 RX ORDER — CHROMIUM 200 MCG
TABLET ORAL
Refills: 0 | Status: ACTIVE | COMMUNITY

## 2023-07-05 RX ORDER — IBUPROFEN 600 MG/1
600 TABLET, FILM COATED ORAL
Qty: 30 | Refills: 0 | Status: DISCONTINUED | COMMUNITY
Start: 2022-11-02 | End: 2023-07-05

## 2023-07-05 NOTE — RESULTS/DATA
[] : results reviewed [de-identified] : 06/30/2023: Results within acceptable limits, mild anemia Hb 11.9, not critical [de-identified] : 06/30/2023: Results within acceptable limits [de-identified] : 06/30/2023: Results within acceptable limits except mild hypokalemia ,K 3\par wnl except K 3.4, mildly low [de-identified] : 06/30/2023: NSR 78 bpm, no acute change

## 2023-07-05 NOTE — ASSESSMENT
[High Risk Surgery - Intraperitoneal, Intrathoracic or Supringuinal Vascular Procedures] : High Risk Surgery - Intraperitoneal, Intrathoracic or Supringuinal Vascular Procedures - No (0) [Ischemic Heart Disease] : Ischemic Heart Disease - No (0) [Congestive Heart Failure] : Congestive Heart Failure - No (0) [Prior Cerebrovascular Accident or TIA] : Prior Cerebrovascular Accident or TIA - No (0) [Creatinine >= 2mg/dL (1 Point)] : Creatinine >= 2mg/dL - No (0) [Insulin-dependent Diabetic (1 Point)] : Insulin-dependent Diabetic - No (0) [0] : 0 , RCRI Class: I, Risk of Post-Op Cardiac Complications: 3.9%, 95% CI for Risk Estimate: 2.8% - 5.4% [Patient Optimized for Surgery] : Patient optimized for surgery [As per surgery] : as per surgery [No Further Testing Recommended] : no further testing recommended [FreeTextEntry4] : Completed physical Exam, reviewed pre op labs, EKG reviewed\par Advised pt to avoid any NSAIDs, vitamins, aspirin for 7 days till post surgery.\par  Avoid any food or drinks past midnight, the day prior to surgery. \par \par \par RCRI Class I for surgery\par Recieved cardiac clearance. \par Pt at current time has been optimized for surgery\par

## 2023-07-05 NOTE — HISTORY OF PRESENT ILLNESS
[No Pertinent Cardiac History] : no history of aortic stenosis, atrial fibrillation, coronary artery disease, recent myocardial infarction, or implantable device/pacemaker [No Pertinent Pulmonary History] : no history of asthma, COPD, sleep apnea, or smoking [(Patient denies any chest pain, claudication, dyspnea on exertion, orthopnea, palpitations or syncope)] : Patient denies any chest pain, claudication, dyspnea on exertion, orthopnea, palpitations or syncope [Good (7-10 METs)] : Good (7-10 METs) [Aortic Stenosis] : no aortic stenosis [Atrial Fibrillation] : no atrial fibrillation [Coronary Artery Disease] : no coronary artery disease [Recent Myocardial Infarction] : no recent myocardial infarction [Implantable Device/Pacemaker] : no implantable device/pacemaker [Asthma] : no asthma [COPD] : no COPD [Sleep Apnea] : no sleep apnea [Smoker] : not a smoker [Family Member] : no family member with adverse anesthesia reaction/sudden death [Self] : no previous adverse anesthesia reaction [Chronic Anticoagulation] : no chronic anticoagulation [Chronic Kidney Disease] : no chronic kidney disease [Diabetes] : no diabetes [FreeTextEntry1] :  arthroscopic surgery of  Left shoulder with manipulation under anaesthesia and lysis of adhesions [FreeTextEntry2] : 11/09/2022 [FreeTextEntry3] : Dr. Refugio Juarez [FreeTextEntry4] : Ms Sergio Couch is a 64 yo female presents today for preop evaluation for arthroscopic surgery of  Left shoulder with manipulation under anaesthesia and lysis of adhesions,  to be done by Dr. Juarez at [facility] on 07/11/2023. She had ORIF of left shoulder in 11/2022. She has decreased ROM of left shoulder when doing overhead activities. [FreeTextEntry7] : 06/30/2023: NSR, no acute change

## 2023-07-05 NOTE — PHYSICAL EXAM
[No Acute Distress] : no acute distress [Well Nourished] : well nourished [EOMI] : extraocular movements intact [Supple] : supple [Clear to Auscultation] : lungs were clear to auscultation bilaterally [Normal S1, S2] : normal S1 and S2 [No Edema] : there was no peripheral edema [Normal Gait] : normal gait [Normal Affect] : the affect was normal [de-identified] : left shoulder in sling, rom limited with pain.

## 2023-08-14 NOTE — ED PROCEDURE NOTE - CPROC ED TIME OUT STATEMENT1
“Patient's name, , procedure and correct site were confirmed during the [place] Timeout.” POCT Blood Glucose

## 2023-09-05 NOTE — ED PROVIDER NOTE - WR ORDER ID 1
"Chief Complaint   Patient presents with    Wellness Visit    Lipids    Thyroid Problem    Depression         SUBJECTIVE:   Jade is a 77 year old who presents for Preventive Visit.      9/5/2023     6:53 AM   Additional Questions   Roomed by Brooke EDDY LPN       Are you in the first 12 months of your Medicare coverage?  No    Healthy Habits:     In general, how would you rate your overall health?  Good    Frequency of exercise:  2-3 days/week    Duration of exercise:  15-30 minutes    Do you usually eat at least 4 servings of fruit and vegetables a day, include whole grains    & fiber and avoid regularly eating high fat or \"junk\" foods?  Yes    Taking medications regularly:  Yes    Medication side effects:  None    Ability to successfully perform activities of daily living:  No assistance needed    Home Safety:  No safety concerns identified    Hearing Impairment:  No hearing concerns    In the past 6 months, have you been bothered by leaking of urine?  No    In general, how would you rate your overall mental or emotional health?  Good        Have you ever done Advance Care Planning? (For example, a Health Directive, POLST, or a discussion with a medical provider or your loved ones about your wishes): Yes, patient states has an Advance Care Planning document and will bring a copy to the clinic.       Fall risk  Fallen 2 or more times in the past year?: No  Any fall with injury in the past year?: Yes    Cognitive Screening   1) Repeat 3 items (Leader, Season, Table)    2) Clock draw: NORMAL  3) 3 item recall: Recalls 3 objects  Results: 3 items recalled: COGNITIVE IMPAIRMENT LESS LIKELY    Mini-CogTM Copyright MARCO De Oliveira. Licensed by the author for use in [facility]; reprinted with permission (eugenio@.[facility]). All rights reserved.      Do you have sleep apnea, excessive snoring or daytime drowsiness? : no    Reviewed and updated as needed this visit by clinical staff   Tobacco  Allergies  Meds  Problems  " Med Hx  Surg Hx  Fam Hx          Reviewed and updated as needed this visit by Provider   Tobacco  Allergies  Meds  Problems  Med Hx  Surg Hx  Fam Hx         Social History     Tobacco Use    Smoking status: Never    Smokeless tobacco: Never   Substance Use Topics    Alcohol use: Yes     Comment: minimal             8/31/2023     4:20 PM   Alcohol Use   Prescreen: >3 drinks/day or >7 drinks/week? No     Do you have a current opioid prescription? No  Do you use any other controlled substances or medications that are not prescribed by a provider? None      Hyperlipidemia Follow-Up    Are you regularly taking any medication or supplement to lower your cholesterol?   Yes- Atorvastatin  Are you having muscle aches or other side effects that you think could be caused by your cholesterol lowering medication?  No  She is concerned about family history of heart disease; reviewed risks, meds and goals of therapy.     Depression Followup  How are you doing with your depression since your last visit? stable  Are you having other symptoms that might be associated with depression? No  Have you had a significant life event?  No   Are you feeling anxious or having panic attacks?   No  Do you have any concerns with your use of alcohol or other drugs? No      Social History     Tobacco Use    Smoking status: Never    Smokeless tobacco: Never   Vaping Use    Vaping Use: Never used   Substance Use Topics    Alcohol use: Yes     Comment: minimal    Drug use: No         7/14/2022     4:37 PM 7/24/2023    12:38 PM 9/4/2023    10:46 AM   PHQ   PHQ-9 Total Score 3 2 2   Q9: Thoughts of better off dead/self-harm past 2 weeks Not at all Not at all Not at all         3/25/2019     4:14 PM 4/26/2022     9:32 AM 7/14/2022     4:37 PM   ANGELIQUE-7 SCORE   Total Score 0 0 2         9/4/2023    10:46 AM   Last PHQ-9   1.  Little interest or pleasure in doing things 0   2.  Feeling down, depressed, or hopeless 0   3.  Trouble falling or staying  asleep, or sleeping too much 1   4.  Feeling tired or having little energy 1   5.  Poor appetite or overeating 0   6.  Feeling bad about yourself 0   7.  Trouble concentrating 0   8.  Moving slowly or restless 0   Q9: Thoughts of better off dead/self-harm past 2 weeks 0   PHQ-9 Total Score 2       Hypothyroidism Follow-up    Since last visit, patient describes the following symptoms: Weight stable, no hair loss, no skin changes, no constipation, no loose stools    Current providers sharing in care for this patient include:   Patient Care Team:  Tatum Ortiz MD as PCP - General (Internal Medicine)  Tatum Ortiz MD as Assigned PCP  Ajit Whyte MD as MD (OB/Gyn)    The following health maintenance items are reviewed in Epic and correct as of today:  Health Maintenance   Topic Date Due    HEMOGLOBIN  10/16/2022    COVID-19 Vaccine (6 - Moderna series) 03/03/2023    TSH W/FREE T4 REFLEX  07/11/2023    MEDICARE ANNUAL WELLNESS VISIT  07/14/2023    ANNUAL REVIEW OF HM ORDERS  07/14/2023    INFLUENZA VACCINE (1) 09/01/2023    BMP  11/03/2023    LIPID  11/03/2023    MICROALBUMIN  11/03/2023    PHQ-9  03/05/2024    FALL RISK ASSESSMENT  09/05/2024    DTAP/TDAP/TD IMMUNIZATION (2 - Td or Tdap) 03/09/2026    ADVANCE CARE PLANNING  08/07/2027    DEXA  07/22/2036    HEPATITIS C SCREENING  Completed    DEPRESSION ACTION PLAN  Completed    Pneumococcal Vaccine: 65+ Years  Completed    URINALYSIS  Completed    ZOSTER IMMUNIZATION  Completed    IPV IMMUNIZATION  Aged Out    HPV IMMUNIZATION  Aged Out    MENINGITIS IMMUNIZATION  Aged Out    MAMMO SCREENING  Discontinued    COLORECTAL CANCER SCREENING  Discontinued     Labs reviewed in EPIC  BP Readings from Last 3 Encounters:   09/05/23 110/62   07/24/23 106/70   07/14/22 112/74    Wt Readings from Last 3 Encounters:   09/05/23 71.6 kg (157 lb 14.4 oz)   07/24/23 71.9 kg (158 lb 9.6 oz)   07/14/22 73.4 kg (161 lb 14.4 oz)                  Patient Active Problem  List   Diagnosis    Hyperlipidemia LDL goal <130    Persistent insomnia    Major depressive disorder, single episode, mild (H)    Gastroesophageal reflux disease without esophagitis    Herpes simplex virus (HSV) infection    Pain in joint involving ankle and foot    Vitamin D deficiency    Hypothyroidism due to acquired atrophy of thyroid    Shoulder pain, left    Osteoarthritis of right knee    Chronic kidney disease, stage 3 (H)    Post-operative state     Past Surgical History:   Procedure Laterality Date    DAVINCI PELVIC PROCEDURE N/A 10/15/2021    Procedure: Xi sacrocolpopexy, abdominal enterocele repair, midurethral sling, cystoscopy;  Surgeon: Stormy Schultz MD;  Location: [facility] OR    EXCISE TOENAIL(S)  10/31/2013    Procedure: EXCISE TOENAIL(S);;  Surgeon: Keith Rausch DPM;  Location: [facility] OR    FOOT HARDWARE REMOVAL      FOOT OSTEOTOMY      HYSTERECTOMY      1994    HYSTERECTOMY      HYSTERECTOMY VAGINAL, BILATERAL SALPINGO-OOPHERECTOMY, COMBINED  1994    ORTHOPEDIC SURGERY  2004    Rt knee arthroscopy, torn meniscus     OSTEOTOMY FOOT  1/3/2013    Procedure: OSTEOTOMY FOOT;;  Surgeon: Keith Rausch DPM;  Location: [facility] OR    OTHER SURGICAL HISTORY  2013 x 3    hammer toe repairx 3 surgeries on multiple toes marivel feet    OTHER SURGICAL HISTORY      rt knee scope    OTHER SURGICAL HISTORY      toenail removal    REMOVE HARDWARE FOOT  10/31/2013    Procedure: REMOVE HARDWARE FOOT;;  Surgeon: Keith Rausch DPM;  Location: [facility]    REPAIR HAMMER TOE  1/3/2013    Procedure: REPAIR HAMMER TOE;  2nd and 3rd metatarsal osteotomy left foot, Hammertoe Correction 2nd,3rd,4th,5th toes left foot;  Surgeon: Keith Rausch DPM;  Location: [facility]    REPAIR HAMMER TOE  4/4/2013    Procedure: REPAIR HAMMER TOE;  Hammer Toe Correction 2nd and 3rd Toe with Metatarsal Osteotomy Right Foot, Flexor Tenotomy 3,4,5 Toes Right Foot;  Surgeon: Keith Rausch DPM;  Location: [facility] OR    REPAIR HAMMER TOE   10/31/2013    Procedure: REPAIR HAMMER TOE;  Left foot hammer toe correction 3rd toe, left foot hardware removal 2nd toe, 3rd toenail avulsion left foot;  Surgeon: Keith Rausch DPM;  Location: [facility] OR    wisdom teeth[      WISDOM TOOTH EXTRACTION      ZZC TOTAL KNEE ARTHROPLASTY Right 7/26/2017    Procedure: RIGHT TOTAL KNEE ARTHROPLASTY;  Surgeon: Erick Gomez MD;  [facility]: [facility];  Service: Orthopedics       Social History     Tobacco Use    Smoking status: Never    Smokeless tobacco: Never   Substance Use Topics    Alcohol use: Yes     Comment: minimal     Family History   Problem Relation Age of Onset    Hypertension Mother     Alzheimer Disease Mother     Lipids Mother     Cerebrovascular Disease Mother     Lipids Father     Heart Disease Father     Diabetes Son     Hypertension Son     Hypertension Son          Current Outpatient Medications   Medication Sig Dispense Refill    alendronate (FOSAMAX) 70 MG tablet Take 1 tablet (70 mg) by mouth every 7 days 12 tablet 3    atorvastatin (LIPITOR) 20 MG tablet Take 1 tablet (20 mg) by mouth daily 90 tablet 3    calcium carbonate-vitamin D 600-200 MG-UNIT TABS       estradiol (ESTRACE) 0.1 MG/GM vaginal cream       FLUoxetine (PROZAC) 10 MG capsule 20 mg per day, weaning to 10 mg as able. 180 capsule 3    GEMTESA 75 MG TABS tablet Take 75 mg by mouth daily      ibuprofen (ADVIL/MOTRIN) 600 MG tablet Take 1 tablet (600 mg) by mouth every 6 hours as needed for moderate pain 30 tablet 0    levothyroxine (SYNTHROID) 150 MCG tablet Take 1 tablet (150 mcg) by mouth daily 90 tablet 3    magnesium 250 MG tablet Take 1 tablet by mouth daily      omeprazole (PRILOSEC) 20 MG DR capsule Take 1 capsule (20 mg) by mouth daily 90 capsule 3    valACYclovir (VALTREX) 500 MG tablet Take 4 tablets at onset of cold sore, repeat in 12 hours 40 tablet 1    ACE/ARB/ARNI NOT PRESCRIBED (INTENTIONAL) Please choose reason not prescribed from choices below.       Allergies  "  Allergen Reactions    Sulfa Antibiotics Rash     Recent Labs   Lab Test 09/05/23  0806 11/03/22  1334 07/11/22  0833 09/27/21  1423 06/28/21  0959 12/25/20  1331   * 138* 147*  --  121*  --    HDL 48* 59 46*  --  49*  --    TRIG 122 153* 182*  --  163*  --    ALT 17 24  --   --  24 19   CR 1.20* 0.84  --    < > 0.98 0.85   GFRESTIMATED 47* 72  --    < > 57* 68   GFRESTBLACK  --   --   --   --  66 79   POTASSIUM 4.0 5.0  --    < > 4.2 3.8   TSH 5.80*  --  2.02   < > 0.15* 3.14    < > = values in this interval not displayed.        Pertinent mammograms are reviewed under the imaging tab.    Review of Systems   Constitutional:  Negative for chills and fever.   HENT:  Negative for congestion, ear pain, hearing loss and sore throat.    Eyes:  Negative for pain and visual disturbance.   Respiratory:  Negative for cough and shortness of breath.    Cardiovascular:  Negative for chest pain, palpitations and peripheral edema.   Gastrointestinal:  Negative for abdominal pain, constipation, diarrhea, heartburn, hematochezia and nausea.   Breasts:  Negative for tenderness, breast mass and discharge.   Genitourinary:  Negative for dysuria, frequency, genital sores, hematuria, pelvic pain, urgency, vaginal bleeding and vaginal discharge.   Musculoskeletal:  Negative for arthralgias, joint swelling and myalgias.   Skin:  Negative for rash.   Neurological:  Negative for dizziness, weakness, headaches and paresthesias.   Psychiatric/Behavioral:  Negative for mood changes. The patient is not nervous/anxious.          OBJECTIVE:   /62   Pulse 70   Temp 97.6  F (36.4  C) (Oral)   Resp 15   Ht 1.651 m (5' 5\")   Wt 71.6 kg (157 lb 14.4 oz)   SpO2 96%   BMI 26.28 kg/m   Estimated body mass index is 26.28 kg/m  as calculated from the following:    Height as of this encounter: 1.651 m (5' 5\").    Weight as of this encounter: 71.6 kg (157 lb 14.4 oz).  Physical Exam  GENERAL: healthy, alert and no distress  NECK: no " adenopathy, no asymmetry, masses, or scars and thyroid normal to palpation  RESP: lungs clear to auscultation - no rales, rhonchi or wheezes  CV: regular rate and rhythm, normal S1 S2, no S3 or S4, no murmur, click or rub, no peripheral edema and peripheral pulses strong  ABDOMEN: soft, nontender, no hepatosplenomegaly, no masses and bowel sounds normal  MS: no gross musculoskeletal defects noted, no edema  NEURO: Normal strength and tone, sensory exam grossly normal, mentation intact, gait normal including heel/toe/tandem walking, and Romberg normal  PSYCH: mentation appears normal, affect normal/bright    Diagnostic Test Results:  Labs reviewed in Epic              ASSESSMENT / PLAN:   (Z00.00) Encounter for Medicare annual wellness exam  (primary encounter diagnosis)  Comment: HEALTH CARE MAINTENANCE reviewed including immunizations  Plan:     (E78.5) Hyperlipidemia LDL goal <130  Comment: Reviewd use of statin and labs; statin use reviewd; increased CV risk based on FH; consider dose adjustment; wait on labs, consider Atorvastatin 30-40 mg  Plan: Lipid panel reflex to direct LDL Fasting,         Comprehensive metabolic panel (BMP + Alb, Alk         Phos, ALT, AST, Total. Bili, TP)          (F32.0) Major depressive disorder, single episode, mild (H)  Comment: SSRI helpful; she is interested in lowering the dose. Change to 10 mg dose and will continue 20 mg alternating with 10 mg. wean as able; wait until MANUEL complete; ultimately the goal is to wean to Fluoxetine 10 mg daily.   Plan: FLUoxetine (PROZAC) 10 MG capsule          (K21.9) Gastroesophageal reflux disease without esophagitis  Comment: EGD 2014 GERD.  no esophagitis or hemorrhage.  PPI daily reviewed. discussed Vit D3  Plan: omeprazole (PRILOSEC) 20 MG DR capsule        Pt reminded of benefits of maximizing calcium and vit D to help with bone health (due to being on PPI)     (M80.00XA) Age-related osteoporosis with current pathological fracture, initial  encounter  Comment: past L1 compression fracture. Osteoporosis- continue Alendronate therapy.   Plan: alendronate (FOSAMAX) 70 MG tablet, Vitamin D Deficiency          (B00.9) Herpes simplex virus (HSV) infection  Comment: 2-3 episodes per year episodic treatment helpful; renew   Plan: valACYclovir (VALTREX) 500 MG tablet          (N18.31) Stage 3a chronic kidney disease (H)  Comment: Keep well hydrated; maintain normal BLOOD PRESSURE; goals of therapy reviewed  Plan: Hemoglobin, Albumin Random Urine Quantitative         with Creat Ratio, Comprehensive metabolic panel        (BMP + Alb, Alk Phos, ALT, AST, Total. Bili, TP)          (E03.4) Hypothyroidism due to acquired atrophy of thyroid  Comment: Clinically euthyroid  Plan: levothyroxine (SYNTHROID) 150 MCG tablet, TSH         WITH FREE T4 REFLEX, T4 free         (M54.16) Lumbar radiculopathy  Comment: one month; Saw [facility]; Medrol Dose Luis Carlos, NSAID; scheduled for MANUEL today.  Plan: keep active    (S32.010S) Compression fracture of L1 vertebra, sequela  Comment: noted on XRay at [facility], healing; was noted on past DEXA  Plan: Vitamin D Deficiency        Continue Bisphosphonate therapy    (Z51.81) Encounter for therapeutic drug monitoring  Comment: Pt on daily PPI, monitor Vit D; at risk for Osteoporosis;  Plan: reviewed use of PPI    (Z79.899) Long-term current use of proton pump inhibitor therapy  Comment: daily PPI use, consider decreasing to every other day.  Plan: reviewed use of PPI    Patient has been advised of split billing requirements and indicates understanding: Yes      COUNSELING:  Reviewed preventive health counseling, as reflected in patient instructions       Regular exercise       Healthy diet/nutrition        She reports that she has never smoked. She has never used smokeless tobacco.      Appropriate preventive services were discussed with this patient, including applicable screening as appropriate for cardiovascular disease, diabetes,  osteopenia/osteoporosis, and glaucoma.  As appropriate for age/gender, discussed screening for colorectal cancer, prostate cancer, breast cancer, and cervical cancer. Checklist reviewing preventive services available has been given to the patient.    Reviewed patients plan of care and provided an AVS. The Complex Care Plan (for patients with higher acuity and needing more deliberate coordination of services) for Jade meets the Care Plan requirement. This Care Plan has been established and reviewed with the Patient.          Tatum Ortiz MD  Internal Medicine   [facility] [place]    40 minutes in addition to HEALTH CARE MAINTENANCE are spent with patient, over 50% of that time spent providing counselling, discussing and reviewing medical conditions/concerns, meds and potential side effects.       Identified Health Risks:  I have reviewed Opioid Use Disorder and Substance Use Disorder risk factors and made any needed referrals. Answers submitted by the patient for this visit:  Patient Health Questionnaire (Submitted on 9/4/2023)  If you checked off any problems, how difficult have these problems made it for you to do your work, take care of things at home, or get along with other people?: Somewhat difficult  PHQ9 TOTAL SCORE: 2     [id number]

## 2023-11-27 ENCOUNTER — APPOINTMENT (OUTPATIENT)
Dept: RADIOLOGY | Facility: HOSPITAL | Age: 63
End: 2023-11-27
Payer: COMMERCIAL

## 2023-11-27 ENCOUNTER — OUTPATIENT (OUTPATIENT)
Dept: OUTPATIENT SERVICES | Facility: HOSPITAL | Age: 63
LOS: 1 days | End: 2023-11-27
Payer: COMMERCIAL

## 2023-11-27 DIAGNOSIS — R22.31 LOCALIZED SWELLING, MASS AND LUMP, RIGHT UPPER LIMB: ICD-10-CM

## 2023-11-27 PROCEDURE — 73140 X-RAY EXAM OF FINGER(S): CPT

## 2023-11-27 PROCEDURE — 73140 X-RAY EXAM OF FINGER(S): CPT | Mod: 26,RT

## 2023-11-28 ENCOUNTER — APPOINTMENT (OUTPATIENT)
Dept: FAMILY MEDICINE | Facility: CLINIC | Age: 63
End: 2023-11-28

## 2023-11-29 ENCOUNTER — APPOINTMENT (OUTPATIENT)
Dept: FAMILY MEDICINE | Facility: CLINIC | Age: 63
End: 2023-11-29
Payer: COMMERCIAL

## 2023-11-29 VITALS
SYSTOLIC BLOOD PRESSURE: 132 MMHG | BODY MASS INDEX: 26.5 KG/M2 | HEART RATE: 82 BPM | DIASTOLIC BLOOD PRESSURE: 80 MMHG | HEIGHT: 62 IN | OXYGEN SATURATION: 98 % | TEMPERATURE: 97.5 F | WEIGHT: 144 LBS | RESPIRATION RATE: 16 BRPM

## 2023-11-29 DIAGNOSIS — M65.9 SYNOVITIS AND TENOSYNOVITIS, UNSPECIFIED: ICD-10-CM

## 2023-11-29 DIAGNOSIS — E87.6 HYPOKALEMIA: ICD-10-CM

## 2023-11-29 DIAGNOSIS — R73.03 PREDIABETES.: ICD-10-CM

## 2023-11-29 DIAGNOSIS — Z00.00 ENCOUNTER FOR GENERAL ADULT MEDICAL EXAMINATION W/OUT ABNORMAL FINDINGS: ICD-10-CM

## 2023-11-29 DIAGNOSIS — D64.9 ANEMIA, UNSPECIFIED: ICD-10-CM

## 2023-11-29 PROCEDURE — 99213 OFFICE O/P EST LOW 20 MIN: CPT

## 2023-12-04 LAB
ALBUMIN SERPL ELPH-MCNC: 4.6 G/DL
ALP BLD-CCNC: 87 U/L
ALT SERPL-CCNC: 34 U/L
ANION GAP SERPL CALC-SCNC: 10 MMOL/L
AST SERPL-CCNC: 27 U/L
BASOPHILS # BLD AUTO: 0.03 K/UL
BASOPHILS NFR BLD AUTO: 0.5 %
BILIRUB SERPL-MCNC: 0.6 MG/DL
BUN SERPL-MCNC: 17 MG/DL
CALCIUM SERPL-MCNC: 10.2 MG/DL
CHLORIDE SERPL-SCNC: 107 MMOL/L
CHOLEST SERPL-MCNC: 200 MG/DL
CO2 SERPL-SCNC: 24 MMOL/L
CREAT SERPL-MCNC: 0.56 MG/DL
EGFR: 103 ML/MIN/1.73M2
EOSINOPHIL # BLD AUTO: 0.1 K/UL
EOSINOPHIL NFR BLD AUTO: 1.8 %
ESTIMATED AVERAGE GLUCOSE: 108 MG/DL
FERRITIN SERPL-MCNC: 176 NG/ML
GLUCOSE SERPL-MCNC: 104 MG/DL
HBA1C MFR BLD HPLC: 5.4 %
HCT VFR BLD CALC: 35.6 %
HDLC SERPL-MCNC: 47 MG/DL
HGB BLD-MCNC: 11.7 G/DL
IMM GRANULOCYTES NFR BLD AUTO: 0.2 %
IRON SATN MFR SERPL: 28 %
IRON SERPL-MCNC: 134 UG/DL
LDLC SERPL CALC-MCNC: 131 MG/DL
LYMPHOCYTES # BLD AUTO: 2.33 K/UL
LYMPHOCYTES NFR BLD AUTO: 41.5 %
MAN DIFF?: NORMAL
MCHC RBC-ENTMCNC: 32.2 PG
MCHC RBC-ENTMCNC: 32.9 GM/DL
MCV RBC AUTO: 98.1 FL
MONOCYTES # BLD AUTO: 0.39 K/UL
MONOCYTES NFR BLD AUTO: 7 %
NEUTROPHILS # BLD AUTO: 2.75 K/UL
NEUTROPHILS NFR BLD AUTO: 49 %
NONHDLC SERPL-MCNC: 153 MG/DL
PLATELET # BLD AUTO: 265 K/UL
POTASSIUM SERPL-SCNC: 4.2 MMOL/L
PROT SERPL-MCNC: 7.4 G/DL
RBC # BLD: 3.63 M/UL
RBC # FLD: 12.8 %
SODIUM SERPL-SCNC: 141 MMOL/L
TIBC SERPL-MCNC: 470 UG/DL
TRIGL SERPL-MCNC: 120 MG/DL
TSH SERPL-ACNC: 3.53 UIU/ML
UIBC SERPL-MCNC: 336 UG/DL
URATE SERPL-MCNC: 7.3 MG/DL
WBC # FLD AUTO: 5.61 K/UL

## 2024-11-17 NOTE — ED ADULT TRIAGE NOTE - STATUS:
Continue ointment as prescribed by your dermatologist.  Recheck with your dermatologist for reevaluation in 5 days as scheduled.  Return for any problems or concerns.    Please call now to arrange your follow-up. Your diagnosis was made based on you presentation today and the information available.  Symptoms often change over time. If you are not improving as expected, please see your doctor or return here sooner that we discussed. If you develop symptoms that concern you, please return right away or go to the Emergency Room promptly.   Applied

## 2025-06-04 ENCOUNTER — APPOINTMENT (OUTPATIENT)
Dept: FAMILY MEDICINE | Facility: CLINIC | Age: 65
End: 2025-06-04

## 2025-06-04 VITALS
BODY MASS INDEX: 26.31 KG/M2 | HEIGHT: 62 IN | HEART RATE: 71 BPM | SYSTOLIC BLOOD PRESSURE: 126 MMHG | OXYGEN SATURATION: 96 % | TEMPERATURE: 98.5 F | WEIGHT: 143 LBS | RESPIRATION RATE: 17 BRPM | DIASTOLIC BLOOD PRESSURE: 68 MMHG

## 2025-06-04 DIAGNOSIS — J06.9 ACUTE UPPER RESPIRATORY INFECTION, UNSPECIFIED: ICD-10-CM

## 2025-06-04 DIAGNOSIS — M19.90 UNSPECIFIED OSTEOARTHRITIS, UNSPECIFIED SITE: ICD-10-CM

## 2025-06-04 DIAGNOSIS — E87.6 HYPOKALEMIA: ICD-10-CM

## 2025-06-04 DIAGNOSIS — R73.03 PREDIABETES.: ICD-10-CM

## 2025-06-04 DIAGNOSIS — R00.2 PALPITATIONS: ICD-10-CM

## 2025-06-04 DIAGNOSIS — R51.9 HEADACHE, UNSPECIFIED: ICD-10-CM

## 2025-06-04 DIAGNOSIS — D64.9 ANEMIA, UNSPECIFIED: ICD-10-CM

## 2025-06-04 DIAGNOSIS — R42 DIZZINESS AND GIDDINESS: ICD-10-CM

## 2025-06-04 PROCEDURE — 81003 URINALYSIS AUTO W/O SCOPE: CPT | Mod: QW

## 2025-06-04 PROCEDURE — 36415 COLL VENOUS BLD VENIPUNCTURE: CPT

## 2025-06-04 PROCEDURE — G2211 COMPLEX E/M VISIT ADD ON: CPT

## 2025-06-04 PROCEDURE — 99215 OFFICE O/P EST HI 40 MIN: CPT

## 2025-06-04 RX ORDER — AMOXICILLIN AND CLAVULANATE POTASSIUM 875; 125 MG/1; MG/1
875-125 TABLET, COATED ORAL
Qty: 20 | Refills: 0 | Status: ACTIVE | COMMUNITY
Start: 2025-06-04 | End: 1900-01-01

## 2025-06-04 RX ORDER — MELOXICAM 7.5 MG/1
7.5 TABLET ORAL DAILY
Qty: 60 | Refills: 0 | Status: ACTIVE | COMMUNITY
Start: 2025-06-04 | End: 1900-01-01

## 2025-06-11 LAB
ALBUMIN SERPL ELPH-MCNC: 4.5 G/DL
ALP BLD-CCNC: 128 U/L
ALT SERPL-CCNC: 39 U/L
ANION GAP SERPL CALC-SCNC: 16 MMOL/L
APPEARANCE: CLEAR
AST SERPL-CCNC: 22 U/L
BACTERIA THROAT CULT: NORMAL
BACTERIA: NEGATIVE /HPF
BASOPHILS # BLD AUTO: 0.04 K/UL
BASOPHILS NFR BLD AUTO: 0.6 %
BILIRUB SERPL-MCNC: 0.3 MG/DL
BILIRUBIN URINE: NEGATIVE
BLOOD URINE: NEGATIVE
BUN SERPL-MCNC: 16 MG/DL
CALCIUM SERPL-MCNC: 10 MG/DL
CAST: 0 /LPF
CHLORIDE SERPL-SCNC: 108 MMOL/L
CHOLEST SERPL-MCNC: 187 MG/DL
CO2 SERPL-SCNC: 22 MMOL/L
COLOR: YELLOW
CREAT SERPL-MCNC: 0.62 MG/DL
CREAT SPEC-SCNC: 265 MG/DL
EGFRCR SERPLBLD CKD-EPI 2021: 99 ML/MIN/1.73M2
EOSINOPHIL # BLD AUTO: 0.22 K/UL
EOSINOPHIL NFR BLD AUTO: 3.2 %
EPITHELIAL CELLS: 2 /HPF
ESTIMATED AVERAGE GLUCOSE: 120 MG/DL
GLUCOSE QUALITATIVE U: NEGATIVE MG/DL
GLUCOSE SERPL-MCNC: 95 MG/DL
HBA1C MFR BLD HPLC: 5.8 %
HCT VFR BLD CALC: 35.4 %
HDLC SERPL-MCNC: 31 MG/DL
HGB BLD-MCNC: 11.1 G/DL
IMM GRANULOCYTES NFR BLD AUTO: 0.4 %
INFLUENZA A RESULT: NOT DETECTED
INFLUENZA B RESULT: NOT DETECTED
IRON SERPL-MCNC: 63 UG/DL
KETONES URINE: NEGATIVE MG/DL
LDLC SERPL-MCNC: 140 MG/DL
LEUKOCYTE ESTERASE URINE: NEGATIVE
LYMPHOCYTES # BLD AUTO: 2.26 K/UL
LYMPHOCYTES NFR BLD AUTO: 33 %
MAN DIFF?: NORMAL
MCHC RBC-ENTMCNC: 31.3 PG
MCHC RBC-ENTMCNC: 31.4 G/DL
MCV RBC AUTO: 99.7 FL
MICROALBUMIN 24H UR DL<=1MG/L-MCNC: 2 MG/DL
MICROALBUMIN/CREAT 24H UR-RTO: 7 MG/G
MICROSCOPIC-UA: NORMAL
MONOCYTES # BLD AUTO: 0.45 K/UL
MONOCYTES NFR BLD AUTO: 6.6 %
NEUTROPHILS # BLD AUTO: 3.84 K/UL
NEUTROPHILS NFR BLD AUTO: 56.2 %
NITRITE URINE: NEGATIVE
NONHDLC SERPL-MCNC: 155 MG/DL
PH URINE: 5.5
PLATELET # BLD AUTO: 460 K/UL
POTASSIUM SERPL-SCNC: 4.1 MMOL/L
PROT SERPL-MCNC: 7.3 G/DL
PROTEIN URINE: NORMAL MG/DL
RBC # BLD: 3.55 M/UL
RBC # FLD: 12.3 %
RED BLOOD CELLS URINE: 4 /HPF
RESP SYN VIRUS RESULT: NOT DETECTED
SARS-COV-2 RESULT: NOT DETECTED
SODIUM SERPL-SCNC: 147 MMOL/L
SPECIFIC GRAVITY URINE: 1.03
TRIGL SERPL-MCNC: 85 MG/DL
TSH SERPL-ACNC: 1.8 UIU/ML
URATE SERPL-MCNC: 6.3 MG/DL
UROBILINOGEN URINE: 0.2 MG/DL
WBC # FLD AUTO: 6.84 K/UL
WHITE BLOOD CELLS URINE: 1 /HPF

## 2025-07-01 ENCOUNTER — RX RENEWAL (OUTPATIENT)
Age: 65
End: 2025-07-01

## 2025-07-02 ENCOUNTER — APPOINTMENT (OUTPATIENT)
Dept: FAMILY MEDICINE | Facility: CLINIC | Age: 65
End: 2025-07-02
Payer: COMMERCIAL

## 2025-07-02 VITALS
WEIGHT: 145 LBS | TEMPERATURE: 97.4 F | BODY MASS INDEX: 26.68 KG/M2 | DIASTOLIC BLOOD PRESSURE: 68 MMHG | HEART RATE: 83 BPM | HEIGHT: 62 IN | SYSTOLIC BLOOD PRESSURE: 114 MMHG | OXYGEN SATURATION: 98 % | RESPIRATION RATE: 16 BRPM

## 2025-07-02 PROCEDURE — 99214 OFFICE O/P EST MOD 30 MIN: CPT | Mod: 25

## 2025-07-02 PROCEDURE — 99396 PREV VISIT EST AGE 40-64: CPT
